# Patient Record
Sex: FEMALE | Race: WHITE | NOT HISPANIC OR LATINO | URBAN - METROPOLITAN AREA
[De-identification: names, ages, dates, MRNs, and addresses within clinical notes are randomized per-mention and may not be internally consistent; named-entity substitution may affect disease eponyms.]

---

## 2021-10-28 ENCOUNTER — TELEHEALTH PROVIDED OTHER THAN IN PATIENT'S HOME (OUTPATIENT)
Dept: URBAN - METROPOLITAN AREA TELEHEALTH 7 | Facility: TELEHEALTH | Age: 31
End: 2021-10-28

## 2021-10-28 VITALS — WEIGHT: 130 LBS | HEIGHT: 64 IN

## 2021-10-28 DIAGNOSIS — R19.7 DIARRHEA, UNSPECIFIED: ICD-10-CM

## 2021-10-28 DIAGNOSIS — K76.89 OTHER SPECIFIED DISEASES OF LIVER: ICD-10-CM

## 2021-10-28 PROCEDURE — 99204 OFFICE O/P NEW MOD 45 MIN: CPT | Mod: 95 | Performed by: PHYSICIAN ASSISTANT

## 2021-10-28 NOTE — SERVICENOTES
Patient's visit was conducted through video telecommunication. Patient consented before the start of visit as to understanding of privacy concerns, possible technological failure, and their responsibility of carrying out instructions of plan.,

I have reviewed the history, physical exam, assessment and management plans.  I concur with or have edited all elements of her note.

## 2021-10-28 NOTE — SERVICEHPINOTES
PATIENT VERIFIED BY DATE OF BIRTH AND NAME. Patient has been consented for this telecommunication visit.
uvaldo bailon  VINCENT ARAIZA   is a   31   year old    female who is being seen in consultation at the request of   GISELLA BRIDGES   for liver lesion. She had a tick bite and was also having some pain under her ribs on the right. Had an abdominal U/S for the pain, which was unremarkable other than 1.8 cm echogenic liver lesion in the right lobe - reported as nonspecific calcified lesion. 
uvaldo bailonIn terms of her right-sided pain, it is off and on. Felt it about 6 months ago - like a numb pain or like pulled muscle. Didn't have the pain for a few months, but then recurred recently, though was minimal pain, slightly achy. Motrin not helpful. She hasn't paid attention to the timing so is unaware of pattern but doesn't think it's correlated with food intake.uvaldo bailon Notes diarrhea for the past year, 2-3 BMs per day. No blood in stools. Has tendency for gas/bloating symptoms. Feels worse if she goes too long without eating. Denies any foods that bother her. When bloated, she has central abdominal pain.
br
uvaldo She denies N/V, fevers, weight loss, joint pains. She does have some muscle soreness and notes recent diagnosis of hypothyroidism so is now on medication. She is active and eats a pretty healthy diet. Denies family h/o GI disease.